# Patient Record
Sex: MALE | Race: WHITE | NOT HISPANIC OR LATINO | Employment: FULL TIME | URBAN - METROPOLITAN AREA
[De-identification: names, ages, dates, MRNs, and addresses within clinical notes are randomized per-mention and may not be internally consistent; named-entity substitution may affect disease eponyms.]

---

## 2022-11-27 ENCOUNTER — NURSE TRIAGE (OUTPATIENT)
Dept: NURSING | Facility: CLINIC | Age: 37
End: 2022-11-27

## 2022-11-27 ENCOUNTER — HOSPITAL ENCOUNTER (EMERGENCY)
Facility: CLINIC | Age: 37
Discharge: HOME | End: 2022-11-28
Attending: EMERGENCY MEDICINE | Admitting: EMERGENCY MEDICINE
Payer: COMMERCIAL

## 2022-11-27 VITALS
HEIGHT: 66 IN | OXYGEN SATURATION: 97 % | SYSTOLIC BLOOD PRESSURE: 146 MMHG | DIASTOLIC BLOOD PRESSURE: 88 MMHG | WEIGHT: 180 LBS | RESPIRATION RATE: 14 BRPM | HEART RATE: 93 BPM | BODY MASS INDEX: 28.93 KG/M2 | TEMPERATURE: 97 F
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
## 2022-11-27 PROCEDURE — 99284 EMERGENCY DEPT VISIT MOD MDM: CPT | Mod: 25

## 2022-11-27 RX ORDER — SACCHAROMYCES BOULARDII 250 MG
250 CAPSULE ORAL 2 TIMES DAILY
Qty: 28 CAPSULE | Refills: 0 | Status: SHIPPED | OUTPATIENT
Start: 2022-11-27 | End: 2022-12-11

## 2022-11-27 RX ORDER — DOXYCYCLINE 100 MG/1
100 CAPSULE ORAL ONCE
Status: COMPLETED | OUTPATIENT
Start: 2022-11-27 | End: 2022-11-28

## 2022-11-27 RX ORDER — DOXYCYCLINE 100 MG/1
100 CAPSULE ORAL 2 TIMES DAILY
Qty: 20 CAPSULE | Refills: 0 | Status: SHIPPED | OUTPATIENT
Start: 2022-11-27 | End: 2022-12-07

## 2022-11-27 RX ORDER — METRONIDAZOLE 500 MG/1
500 TABLET ORAL 2 TIMES DAILY
Qty: 20 TABLET | Refills: 0 | Status: SHIPPED | OUTPATIENT
Start: 2022-11-27 | End: 2022-12-07

## 2022-11-27 RX ORDER — METRONIDAZOLE 500 MG/1
500 TABLET ORAL ONCE
Status: COMPLETED | OUTPATIENT
Start: 2022-11-27 | End: 2022-11-28

## 2022-11-27 ASSESSMENT — ENCOUNTER SYMPTOMS: WOUND: 1

## 2022-11-28 PROCEDURE — 90715 TDAP VACCINE 7 YRS/> IM: CPT | Performed by: EMERGENCY MEDICINE

## 2022-11-28 PROCEDURE — 90471 IMMUNIZATION ADMIN: CPT | Performed by: EMERGENCY MEDICINE

## 2022-11-28 PROCEDURE — 250N000011 HC RX IP 250 OP 636: Performed by: EMERGENCY MEDICINE

## 2022-11-28 PROCEDURE — 250N000013 HC RX MED GY IP 250 OP 250 PS 637: Performed by: EMERGENCY MEDICINE

## 2022-11-28 RX ADMIN — CLOSTRIDIUM TETANI TOXOID ANTIGEN (FORMALDEHYDE INACTIVATED), CORYNEBACTERIUM DIPHTHERIAE TOXOID ANTIGEN (FORMALDEHYDE INACTIVATED), BORDETELLA PERTUSSIS TOXOID ANTIGEN (GLUTARALDEHYDE INACTIVATED), BORDETELLA PERTUSSIS FILAMENTOUS HEMAGGLUTININ ANTIGEN (FORMALDEHYDE INACTIVATED), BORDETELLA PERTUSSIS PERTACTIN ANTIGEN, AND BORDETELLA PERTUSSIS FIMBRIAE 2/3 ANTIGEN 0.5 ML: 5; 2; 2.5; 5; 3; 5 INJECTION, SUSPENSION INTRAMUSCULAR at 00:15

## 2022-11-28 RX ADMIN — DOXYCYCLINE HYCLATE 100 MG: 100 CAPSULE ORAL at 00:14

## 2022-11-28 RX ADMIN — METRONIDAZOLE 500 MG: 500 TABLET ORAL at 00:14

## 2022-11-28 NOTE — TELEPHONE ENCOUNTER
Bit on finger by a squirrel about 10 min ago. Bite did bleed but only a small amount. Smaller than 1/8 inch.  Advised see provider within 4 hrs - advised to be seen at ED tonight (Everything else closed)  Not seen at [facility]. Pt voiced understanding and agreement.       Reason for Disposition    [1] Puncture wound or small cut AND [2] on hands or genitals (Exception: puncture  from small pet such as gerbil, mouse, hamster, puppy or turtle)    Additional Information    Negative: [1] Major bleeding (e.g., actively dripping or spurting) AND [2] can't be stopped    Negative: Sounds like a life-threatening emergency to the triager    Negative: Snake bite    Negative: Bite, wound, or sting from fish    Negative: [1] Any break in skin from BITE (e.g., cut, puncture or scratch) AND[2] WILD animal at risk for RABIES (e.g., bat, raccoon, garcía, skunk, coyote, other carnivores)    Negative: [1] Any break in skin from BITE (e.g., cut, puncture or scratch) AND[2] PET animal (e.g., dog, cat, or ferret) at risk for RABIES (e.g., sick, stray, unprovoked bite, developing country)    Negative: [1] Any break in skin from BITE (e.g., cut, puncture or scratch) AND[2] monkey    Negative: [1] EXPOSURE of non-intact skin (e.g., exposed person has dermatitis, abrasion, wound) AND[2] with animal BODY FLUID (e.g., saliva such as licking, blood, brain) AND[3] animal at high-risk for RABIES (e.g., bat, raccoon, garcía, skunk, coyote, other carnivores)    Negative: [1] Cut (length > 1/8 inch or 3 mm) or skin tear AND [2] any animal  (Exception: superficial scratch that doesn't go through dermis)    Negative: [1] Bleeding AND [2] won't stop after 10 minutes of direct pressure (using correct technique)    Negative: Description of bite sounds severe to the triager    Negative: [1] Puncture wound (hole through the skin) AND [2] from a cat bite (or deep claw puncture wound)    Negative: [1] Puncture wound or small cut AND [2] on face (Exception: puncture   from small pet such as gerbil, mouse, hamster, puppy)    Protocols used: ANIMAL BITE-A-AH

## 2022-11-28 NOTE — ED PROVIDER NOTES
"  History   Chief Complaint:  Animal Bite       The history is provided by the patient.      Hugh Shook is a 37 year old male who presents with squirrel bite. The patient states that while trying to relocate a squirrel which was by his care, it turned around and bit his right hand through a glove and plastic bag. His last Tdap vaccine was in 2014.      Review of Systems   Skin: Positive for wound (bite on right hand).   All other systems reviewed and are negative.        Allergies:  Penicillins    Medications:  Albuterol   Adderall     Past Medical History:     ADD  Anxiety   Anaplasmosis       Past Surgical History:    The patient denies past surgical history.      Family History:    The patient denies past family history.     Social History:  Patient came from home.  Patient is unaccompanied in the ED.  PCP: No primary care provider on file.     Physical Exam     Patient Vitals for the past 24 hrs:   BP Temp Temp src Pulse Resp SpO2 Height Weight   11/27/22 2300 (!) 146/88 97  F (36.1  C) Temporal 93 14 97 % 1.676 m (5' 6\") 81.6 kg (180 lb)       Physical Exam  Constitutional:       Appearance: Normal appearance.   Cardiovascular:      Rate and Rhythm: Normal rate.   Pulmonary:      Effort: Pulmonary effort is normal.   Musculoskeletal:      Comments: No deformity of the hand or finger   Skin:     Capillary Refill: Capillary refill takes less than 2 seconds.      Comments: There is a superficial puncture wound on the tip of the right index finger.   Neurological:      Mental Status: He is alert.   Psychiatric:         Mood and Affect: Mood normal.           Emergency Department Course     Reviewed:  I reviewed nursing notes, vitals, past medical history and Care Everywhere    Assessments:  2345 I obtained history and examined the patient as noted above.      Interventions:   Tdap 0.5 mL IM    Disposition:  The patient was discharged to home.     Impression & Plan     Medical Decision Making:  This patient " presents with a squirrel bite.  Rabies prophylaxis is not indicated.  His tetanus vaccine was updated.  He had local wound care.  Given his penicillin allergy he will be covered with doxycycline and metronidazole.  This was initiated here in the ED.  We discussed follow-up and return precautions.      Diagnosis:    ICD-10-CM    1. Wound due to squirrel bite  W53.21XA           Discharge Medications:  New Prescriptions    DOXYCYCLINE HYCLATE (VIBRAMYCIN) 100 MG CAPSULE    Take 1 capsule (100 mg) by mouth 2 times daily for 10 days    METRONIDAZOLE (FLAGYL) 500 MG TABLET    Take 1 tablet (500 mg) by mouth 2 times daily for 10 days    SACCHAROMYCES BOULARDII (FLORASTOR) 250 MG CAPSULE    Take 1 capsule (250 mg) by mouth 2 times daily for 14 days       Scribe Disclosure:  I, Sanchez Ahumada, am serving as a scribe at 11:37 PM on 11/27/2022 to document services personally performed by Isael Cristina MD based on my observations and the provider's statements to me.        Isael Cristina MD  11/28/22 0013

## 2022-11-28 NOTE — ED TRIAGE NOTES
Bitten by a squirrel 30 mins PTA. puncture wound to right inex finger     Triage Assessment     Row Name 11/27/22 5873       Triage Assessment (Adult)    Airway WDL WDL       Respiratory WDL    Respiratory WDL WDL       Skin Circulation/Temperature WDL    Skin Circulation/Temperature WDL X  pincturo wound to right inex finger       Cardiac WDL    Cardiac WDL WDL       Peripheral/Neurovascular WDL    Peripheral Neurovascular WDL WDL       Cognitive/Neuro/Behavioral WDL    Cognitive/Neuro/Behavioral WDL WDL

## 2022-11-28 NOTE — DISCHARGE INSTRUCTIONS
Follow-up with your physician in 2 to 3 days for recheck.    Return to the ER for increased pain, fever, redness or swelling, or any new concerns.    Please wash your wound with warm soapy water at least twice daily.    Do not drink alcohol while taking metronidazole as this can lead to an adverse reaction.

## 2023-06-23 ENCOUNTER — OFFICE VISIT (OUTPATIENT)
Dept: URGENT CARE | Facility: URGENT CARE | Age: 38
End: 2023-06-23
Payer: COMMERCIAL

## 2023-06-23 VITALS
SYSTOLIC BLOOD PRESSURE: 118 MMHG | OXYGEN SATURATION: 95 % | DIASTOLIC BLOOD PRESSURE: 80 MMHG | TEMPERATURE: 98 F | RESPIRATION RATE: 16 BRPM | HEART RATE: 59 BPM

## 2023-06-23 DIAGNOSIS — H10.029 PINK EYE, UNSPECIFIED LATERALITY: Primary | ICD-10-CM

## 2023-06-23 PROCEDURE — 99203 OFFICE O/P NEW LOW 30 MIN: CPT | Performed by: FAMILY MEDICINE

## 2023-06-23 RX ORDER — TOBRAMYCIN 3 MG/ML
2 SOLUTION/ DROPS OPHTHALMIC 4 TIMES DAILY
Qty: 5 ML | Refills: 0 | Status: SHIPPED | OUTPATIENT
Start: 2023-06-23 | End: 2023-06-30

## 2023-06-23 NOTE — PROGRESS NOTES
SUBJECTIVE:Chief Complaint:   Chief Complaint   Patient presents with     Eye Problem     R eye redness, drainage, and irritation      History of Present Illness: Hugh Shook is a 38 year old male who presents complaining of both eyes mattering and redness for Today.  Onset/timing: gradual.   Contact wearer : No    No past medical history on file.  Allergies   Allergen Reactions     Amoxicillin      Penicillins Hives     Social History     Tobacco Use     Smoking status: Some Days     Types: Cigarettes     Smokeless tobacco: Never   Substance Use Topics     Alcohol use: Not on file       ROS:  negative for photophobia, pain, vision change    OBJECTIVE:  /80   Pulse 59   Temp 98  F (36.7  C) (Tympanic)   Resp 16   SpO2 95%    General: no acute distress  Eye exam: right eye abnormal findings: conjunctivitis with erythema, discharge and matting noted, left eye abnormal findings: conjunctivitis with erythema, discharge and matting noted.  HELEN, EOMI, fundi normal, corneas normal, no foreign bodies, visual acuity normal both eyes, no periorbital cellulitis      ICD-10-CM    1. Pink eye, unspecified laterality  H10.029 tobramycin (TOBREX) 0.3 % ophthalmic solution

## 2024-03-25 ENCOUNTER — OFFICE VISIT (OUTPATIENT)
Dept: FAMILY MEDICINE | Facility: CLINIC | Age: 39
End: 2024-03-25
Payer: COMMERCIAL

## 2024-03-25 ENCOUNTER — OFFICE VISIT (OUTPATIENT)
Dept: OPTOMETRY | Facility: CLINIC | Age: 39
End: 2024-03-25
Payer: COMMERCIAL

## 2024-03-25 VITALS
HEART RATE: 66 BPM | BODY MASS INDEX: 28.67 KG/M2 | DIASTOLIC BLOOD PRESSURE: 78 MMHG | WEIGHT: 178.4 LBS | TEMPERATURE: 97.9 F | RESPIRATION RATE: 18 BRPM | SYSTOLIC BLOOD PRESSURE: 118 MMHG | HEIGHT: 66 IN | OXYGEN SATURATION: 100 %

## 2024-03-25 DIAGNOSIS — H57.11 ACUTE RIGHT EYE PAIN: Primary | ICD-10-CM

## 2024-03-25 DIAGNOSIS — S05.01XA ABRASION OF RIGHT CORNEA, INITIAL ENCOUNTER: Primary | ICD-10-CM

## 2024-03-25 PROCEDURE — 99213 OFFICE O/P EST LOW 20 MIN: CPT | Performed by: FAMILY MEDICINE

## 2024-03-25 PROCEDURE — 92002 INTRM OPH EXAM NEW PATIENT: CPT | Performed by: OPTOMETRIST

## 2024-03-25 RX ORDER — MOXIFLOXACIN 5 MG/ML
1 SOLUTION/ DROPS OPHTHALMIC 4 TIMES DAILY
Qty: 3 ML | Refills: 0 | Status: SHIPPED | OUTPATIENT
Start: 2024-03-25 | End: 2024-03-30

## 2024-03-25 ASSESSMENT — SLIT LAMP EXAM - LIDS
COMMENTS: NORMAL
COMMENTS: MILD EDEMA

## 2024-03-25 ASSESSMENT — ENCOUNTER SYMPTOMS: EYE PAIN: 1

## 2024-03-25 ASSESSMENT — EXTERNAL EXAM - LEFT EYE: OS_EXAM: NORMAL

## 2024-03-25 ASSESSMENT — VISUAL ACUITY
METHOD: SNELLEN - LINEAR
OS_SC: 20/20
OD_SC+: -3
OS_SC+: -1
OD_SC: 20/25

## 2024-03-25 ASSESSMENT — PAIN SCALES - GENERAL: PAINLEVEL: EXTREME PAIN (9)

## 2024-03-25 ASSESSMENT — EXTERNAL EXAM - RIGHT EYE: OD_EXAM: NORMAL

## 2024-03-25 ASSESSMENT — TONOMETRY
IOP_METHOD: APPLANATION
OD_IOP_MMHG: 18

## 2024-03-25 NOTE — PATIENT INSTRUCTIONS
Vigamox- 1 drop right eye 4 x day for 5 days.    There is a contact lens in your eye to make you more comfortable.  It is ok to sleep in the lens.  Do not remove it.  If it falls out do not reinsert it.  You need to return to have it removed.    Your right eye will be dilated for 4-6 hours.    Return in 24-48 hours for recheck or sooner if needed.    Javad Fong OD      Optometry Providers       Clinic Locations                                 Telephone Number   Dr. Crystal Ferrari    [place]   [facility]  [place] [phone]     [place] [facility] Hours:                [facility] Hours:       [place] [facility] Hours:   [address]   [address] [address] [facility]     [address] [facility]  [place] [address]   [place] [address]    [place] [address]  Phone: [phone]                    Phone: [phone]     Phone: [phone]                      Monday 8:00-6:00                          Monday 8:00-6:00                          Monday 8:00-6:00              Tuesday 8:00-6:00                          Tuesday 8:00-6:00                          Tuesday 8:00-6:00              Wednesday 8:00-6:00                  Wednesday 8:00-6:00                   Wednesday 8:00-6:00      Thursday 8:00-6:00                        Thursday 8:00-6:00                         Thursday 8:00-6:00            Friday 8:00-5:00                              Friday 8:00-5:00                              Friday 8:00-5:00    [facility] Hours:   [address] [place] [id number] [phone]    Monday 9:00-6:00  Tuesday 9:00-6:00  Wednesday 9:00-6:00  Thursday 9:00-6:00  Friday 9:00-5:00  As always, Thank you for trusting us with your health care needs!

## 2024-03-25 NOTE — LETTER
3/25/2024         RE: Hugh Shook  [address] [facility] [address] [facility] [address]        Dear Colleague,    Thank you for referring your patient, Hugh Shook, to the [facility]. Please see a copy of my visit note below.    Chief Complaint   Patient presents with     Eye Pain Right Eye     Patient flushed with visine and flushed with water. Patient was scratched with dog paw last night DOI 3-        Laterality:In right eye  Quality:foreign body sensation and sharp pain  Pain scale:9/10  Frequency:constantlyDuration:1 dayCourse:gradually worsening  Associated symptoms:blurred vision, foreign body sensation, discharge, redness, photophobia, tearing, and swelling  Treatments tried:eye drops  Response to treatment:no improvement  Comments:Patient flushed with visine and flushed with water.   Patient was scratched with dog paw last night DOI 3-       Medical, surgical and family histories reviewed and updated 3/25/2024.       OBJECTIVE: See Ophthalmology exam    ASSESSMENT:    ICD-10-CM    1. Abrasion of right cornea, initial encounter  S05.01XA moxifloxacin (VIGAMOX) 0.5 % ophthalmic solution         PLAN:     Patient Instructions   Vigamox- 1 drop right eye 4 x day for 5 days.    There is a contact lens in your eye to make you more comfortable.  It is ok to sleep in the lens.  Do not remove it.  If it falls out do not reinsert it.  You need to return to have it removed.    Return in 24-48 hours for recheck or sooner if needed.    Javad Fong, CONNIE         Again, thank you for allowing me to participate in the care of your patient.        Sincerely,        Javad Fong, OD

## 2024-03-25 NOTE — PROGRESS NOTES
Chief Complaint   Patient presents with    Eye Pain Right Eye     Patient flushed with visine and flushed with water. Patient was scratched with dog paw last night DOI 3-        Laterality:In right eye  Quality:foreign body sensation and sharp pain  Pain scale:9/10  Frequency:constantlyDuration:1 dayCourse:gradually worsening  Associated symptoms:blurred vision, foreign body sensation, discharge, redness, photophobia, tearing, and swelling  Treatments tried:eye drops  Response to treatment:no improvement  Comments:Patient flushed with visine and flushed with water.   Patient was scratched with dog paw last night DOI 3-       Medical, surgical and family histories reviewed and updated 3/25/2024.       OBJECTIVE: See Ophthalmology exam    ASSESSMENT:    ICD-10-CM    1. Abrasion of right cornea, initial encounter  S05.01XA moxifloxacin (VIGAMOX) 0.5 % ophthalmic solution         PLAN:     Patient Instructions   Vigamox- 1 drop right eye 4 x day for 5 days.    There is a contact lens in your eye to make you more comfortable.  It is ok to sleep in the lens.  Do not remove it.  If it falls out do not reinsert it.  You need to return to have it removed.    Return in 24-48 hours for recheck or sooner if needed.    Javad Fong, OD

## 2024-03-25 NOTE — PROGRESS NOTES
"  Assessment & Plan     Acute right eye pain  - was scratched in his right eye by his dog yesterday and since then noticing severe discomfort ,redness, photophobia.  -Hugh mentioned that it hurts even with doing pupillary reflex.    -Reached out to eye department in our clinic who were kind enough to see patient the same day for thorough examination in view of his nail scratch injury.        10 minutes spent by me on the date of the encounter doing chart review, patient visit, and documentation       Nicotine/Tobacco Cessation  He reports that he has been smoking cigarettes. He has never used smokeless tobacco.        BMI  Estimated body mass index is 28.79 kg/m  as calculated from the following:    Height as of this encounter: 1.676 m (5' 6\").    Weight as of this encounter: 80.9 kg (178 lb 6.4 oz).             Subjective   Hugh is a 39 year old, presenting for the following health issues:  Eye Problem      3/25/2024     2:17 PM   Additional Questions   Roomed by DM     Eye Problem     History of Present Illness       Reason for visit:  Eye injury  Symptom onset:  1-3 days ago  Symptoms include:  Severe eye pain  Symptom intensity:  Severe  Symptom progression:  Staying the same  Had these symptoms before:  No  What makes it worse:  Using vision  What makes it better:  Resting eye    He eats 4 or more servings of fruits and vegetables daily.He consumes 0 sweetened beverage(s) daily.He exercises with enough effort to increase his heart rate 10 to 19 minutes per day.  He exercises with enough effort to increase his heart rate 3 or less days per week.   He is taking medications regularly.                 Review of Systems  Constitutional, HEENT, cardiovascular, pulmonary, gi and gu systems are negative, except as otherwise noted.      Objective    /78 (BP Location: Left arm, Patient Position: Sitting)   Pulse 66   Temp 97.9  F (36.6  C)   Resp 18   Ht 1.676 m (5' 6\")   Wt 80.9 kg (178 lb 6.4 " oz)   SpO2 100%   BMI 28.79 kg/m    Body mass index is 28.79 kg/m .  Physical Exam   GENERAL: alert and no distress  RESP: lungs clear to auscultation - no rales, rhonchi or wheezes  CV: regular rate and rhythm, normal S1 S2, no S3 or S4, no murmur, click or rub, no peripheral edema              Signed Electronically by: Francisco Valdez MD

## 2024-03-27 ENCOUNTER — OFFICE VISIT (OUTPATIENT)
Dept: OPTOMETRY | Facility: CLINIC | Age: 39
End: 2024-03-27
Payer: COMMERCIAL

## 2024-03-27 DIAGNOSIS — S05.01XD ABRASION OF RIGHT CORNEA, SUBSEQUENT ENCOUNTER: Primary | ICD-10-CM

## 2024-03-27 PROCEDURE — 99213 OFFICE O/P EST LOW 20 MIN: CPT | Performed by: OPTOMETRIST

## 2024-03-27 ASSESSMENT — VISUAL ACUITY
OS_SC+: -1
OD_SC+: -2
METHOD: SNELLEN - LINEAR
OS_SC: 20/20
OD_SC: 20/40

## 2024-03-27 ASSESSMENT — EXTERNAL EXAM - RIGHT EYE: OD_EXAM: NORMAL

## 2024-03-27 ASSESSMENT — REFRACTION_CURRENTRX
OD_BRAND: ALCON AIR OPTIX NIGHT & DAY AQUA BC 8.4, D 13.8
OD_SPHERE: -0.50

## 2024-03-27 ASSESSMENT — SLIT LAMP EXAM - LIDS
COMMENTS: NORMAL
COMMENTS: NORMAL

## 2024-03-27 ASSESSMENT — EXTERNAL EXAM - LEFT EYE: OS_EXAM: NORMAL

## 2024-03-27 NOTE — PATIENT INSTRUCTIONS
Continue vigamox- 1 drop right eye 4 x day for 3 more days and discontinue.    Non preserved artificial tears- 1 drop right eye 2- 4 x day.    Recommend indefinitely using an artificial tear or gel drop at night at bedtime.    Return to see me if signs/symptoms persist or worsen.    Recommend annual eye exams.  Check insurance coverage as we are not providers for any vision plans.    Javad Fong OD      Optometry Providers       Clinic Locations                                 Telephone Number   Dr. Crystal Ferrari    [place]   [facility]  [place] [phone]     [place] [facility] Hours:                [facility] Hours:       [facility] [facility] Hours:   [address]   [address] [address]      [address] [facility]  [place] [address]   [place] [address]    [place] [address]  Phone: [phone]                    Phone: [phone]     Phone: [phone]                      Monday 8:00-6:00                          Monday 8:00-6:00                          Monday 8:00-6:00              Tuesday 8:00-6:00                          Tuesday 8:00-6:00                          Tuesday 8:00-6:00              Wednesday 8:00-6:00                  Wednesday 8:00-6:00                   Wednesday 8:00-6:00      Thursday 8:00-6:00                        Thursday 8:00-6:00                         Thursday 8:00-6:00            Friday 8:00-5:00                              Friday 8:00-5:00                              Friday 8:00-5:00    [facility] Hours:   [address] [place] [address] [phone]    Monday 9:00-6:00  Tuesday 9:00-6:00  Wednesday 9:00-6:00  Thursday 9:00-6:00  Friday 9:00-5:00  As always, Thank you for trusting us with your health care needs!

## 2024-03-27 NOTE — LETTER
3/27/2024         RE: Hugh Shook  [address]  [facility] [address]        Dear Colleague,    Thank you for referring your patient, Hugh Shook, to the [facility]. Please see a copy of my visit note below.    Red Eye Recheck    Reason:   Chief Complaint   Patient presents with     Corneal Abrasion Follow Up     Right eye felt better yesterday     Eyes feel: worse    Medications used: vigamox    How often: 4 times per day yesterday      Benita Antony, Optometric Assistant, A.B.O.C.     OBJECTIVE:     See ophthalmology exam    ASSESSMENT:        ICD-10-CM    1. Abrasion of right cornea, subsequent encounter  S05.01XD     Improved             PLAN:       Patient Instructions   Continue vigamox- 1 drop right eye 4 x day for 3 more days and discontinue.    Non preserved artificial tears- 1 drop right eye 2- 4 x day.    Recommend indefinitely using an artificial tear or gel drop at night at bedtime.    Return to see me if signs/symptoms persist or worsen.    Recommend annual eye exams.  Check insurance coverage as we are not providers for any vision plans.    Javad Fong, OD      Patient waited 15 minutes in the office to see if he wanted a new bandage contact lens placed after the proparacaine wore off.  He felt eye was comfortable enough not to have a new lens placed.        Again, thank you for allowing me to participate in the care of your patient.        Sincerely,        Javad Fong, OD

## 2024-03-27 NOTE — PROGRESS NOTES
Red Eye Recheck    Reason:   Chief Complaint   Patient presents with    Corneal Abrasion Follow Up     Right eye felt better yesterday     Eyes feel: worse    Medications used: vigamox    How often: 4 times per day yesterday      Benita Antony, Optometric Assistant, A.B.O.C.     OBJECTIVE:     See ophthalmology exam    ASSESSMENT:        ICD-10-CM    1. Abrasion of right cornea, subsequent encounter  S05.01XD     Improved             PLAN:       Patient Instructions   Continue vigamox- 1 drop right eye 4 x day for 3 more days and discontinue.    Non preserved artificial tears- 1 drop right eye 2- 4 x day.    Recommend indefinitely using an artificial tear or gel drop at night at bedtime.    Return to see me if signs/symptoms persist or worsen.    Recommend annual eye exams.  Check insurance coverage as we are not providers for any vision plans.    Javad Fong, OD      Patient waited 15 minutes in the office to see if he wanted a new bandage contact lens placed after the proparacaine wore off.  He felt eye was comfortable enough not to have a new lens placed.